# Patient Record
Sex: FEMALE | Race: WHITE | NOT HISPANIC OR LATINO
[De-identification: names, ages, dates, MRNs, and addresses within clinical notes are randomized per-mention and may not be internally consistent; named-entity substitution may affect disease eponyms.]

---

## 2023-05-28 ENCOUNTER — APPOINTMENT (OUTPATIENT)
Dept: ORTHOPEDIC SURGERY | Facility: CLINIC | Age: 73
End: 2023-05-28
Payer: MEDICARE

## 2023-05-28 VITALS — BODY MASS INDEX: 22.9 KG/M2 | WEIGHT: 160 LBS | HEIGHT: 70 IN

## 2023-05-28 PROBLEM — Z00.00 ENCOUNTER FOR PREVENTIVE HEALTH EXAMINATION: Status: ACTIVE | Noted: 2023-05-28

## 2023-05-28 PROCEDURE — J3490M: CUSTOM | Mod: NC

## 2023-05-28 PROCEDURE — 20610 DRAIN/INJ JOINT/BURSA W/O US: CPT | Mod: RT

## 2023-05-28 PROCEDURE — 99213 OFFICE O/P EST LOW 20 MIN: CPT | Mod: 25

## 2023-05-28 PROCEDURE — 73562 X-RAY EXAM OF KNEE 3: CPT | Mod: RT

## 2023-05-28 NOTE — HISTORY OF PRESENT ILLNESS
Put page out to Dr. Clarence Tatum for [facility] Barrow  11/09/17 [id number] [10] : 10 [6] : 6 [Radiating] : radiating [Sharp] : sharp [Tightness] : tightness [Constant] : constant [Meds] : meds [de-identified] : 5/28/23: pt is a 73 y/o F presents with right knee pain; onset of pain on 5 2023; pt states there was no injury; progression of pain in the right knee\par \par PMH: Denied\par Allergies: PCN / EES / Sulfa [] : no [FreeTextEntry7] : from the front of knee into the back of the knee [FreeTextEntry9] : ibuprofen (minimal relief)

## 2023-05-28 NOTE — ASSESSMENT
[FreeTextEntry1] : The patient was advised of the diagnosis. The natural history of the pathology was explained in full to the patient in layman's terms. All questions were answered. The risks and benefits of surgical and non-surgical treatment alternatives were explained in full to the patient.\par Large joint injection of the right knee is performed. The indication for this procedure was pain, inflammation. \par The site was prepped with alcohol, ethyl chloride sprayed topically and sterile technique used. An injection of Lidocaine 3cc of 2% , Bupivacaine (Marcaine) 3cc of 0.5% , Triamcinolone (Kenalog) 2cc of 40 mg  was used.   Patient tolerated procedure well. Patient was advised to call if redness, pain or fever occur and apply ice for 15 minutes out of every hour for the next 12-24 hours as tolerated. The risks benefits, and alternatives have been discussed, and verbal consent was obtained.\par \par Pt provided right knee CSI today.\par RTO in 2 weeks for f/u care with Dr. García.\par OTC Aleve bid prn pain.

## 2023-05-28 NOTE — IMAGING
[Right] : right knee [There are no fractures, subluxations or dislocations. No significant abnormalities are seen] : There are no fractures, subluxations or dislocations. No significant abnormalities are seen [de-identified] : Right hip skin changes / bony deformity:\par ROM: Full and pain free. \par TTP: none\par \par \par Right knee examination shows-\par Mild effusion is noted\par TTP: over the lateral joint\par Strength testin/5 all planes\par Anterior Drawer Test : negative\par Lachman Test: negative\par Valgus Stress Test: negative\par Varus Stress Test : negative\par Posterior Drawer Test: negative\par Iman Test: negative\par Calf is soft , supple and nttp.\par Lower extremity is nvi.\par Gait is: minimally antalgic to the left. \par  [FreeTextEntry9] : right medial and PTF moderate OA.

## 2023-06-06 ENCOUNTER — APPOINTMENT (OUTPATIENT)
Dept: ORTHOPEDIC SURGERY | Facility: CLINIC | Age: 73
End: 2023-06-06
Payer: MEDICARE

## 2023-06-06 VITALS — BODY MASS INDEX: 22.9 KG/M2 | WEIGHT: 160 LBS | HEIGHT: 70 IN

## 2023-06-06 DIAGNOSIS — Z78.9 OTHER SPECIFIED HEALTH STATUS: ICD-10-CM

## 2023-06-06 PROCEDURE — 99213 OFFICE O/P EST LOW 20 MIN: CPT

## 2023-06-06 NOTE — IMAGING
[Right] : right knee [All Views] : anteroposterior, lateral, skyline, and anteroposterior standing [There are no fractures, subluxations or dislocations. No significant abnormalities are seen] : There are no fractures, subluxations or dislocations. No significant abnormalities are seen [Mild tricompartmental OA medial narrowing] : Mild tricompartmental OA medial narrowing

## 2023-06-08 ENCOUNTER — APPOINTMENT (OUTPATIENT)
Dept: ORTHOPEDIC SURGERY | Facility: CLINIC | Age: 73
End: 2023-06-08
Payer: MEDICARE

## 2023-06-08 VITALS — BODY MASS INDEX: 22.9 KG/M2 | HEIGHT: 70 IN | WEIGHT: 160 LBS

## 2023-06-08 PROCEDURE — 99213 OFFICE O/P EST LOW 20 MIN: CPT

## 2023-06-08 NOTE — HISTORY OF PRESENT ILLNESS
[2] : 2 [0] : 0 [Dull/Aching] : dull/aching [Localized] : localized [Occasional] : occasional [Rest] : rest [Meds] : meds [Ice] : ice [Injection therapy] : injection therapy [Walking] : walking [Stairs] : stairs [Retired] : Work status: retired [de-identified] : 71 yo F here for right knee pain since end of May 2023 with no specific injury. No prior injuries or surgeries to the knee. Denies buckling/locking, numbness/tingling. Has occasional stiffness.Was seen at [facility] on 5/28/23, had x-rays, Tx with CSI which gave good relief. Has been icing and resting. [] : Post Surgical Visit: no [FreeTextEntry1] : RT knee [FreeTextEntry5] : 73 yo F here for right knee pain since end of May 2023 with no specific injury. No prior injuries or surgeries to the knee. Denies buckling/locking, numbness/tingling. Has occasional stiffness.Was seen at [facility] on 5/28/23, had x-rays, Tx with CSI which gave good relief. Has been icing and resting. Went to see Dr. García on 6/6/23. Here for second opinion.  [FreeTextEntry9] : Ibuprofen [de-identified] : 6/6/23 [de-identified] : Dr. García [de-identified] : X-rays [de-identified] : none

## 2023-06-08 NOTE — IMAGING
[de-identified] : No effusion, no warmth, no ecchymosis\par Medial joint line tenderness to palpation \par Range of motion 0-130 with mild anterior crepitus\par 5/5 quadriceps and hamstring strength\par Ligamentously stable\par Motor and sensory intact distally\par Non antalgic gait\par Negative Maegan\par

## 2023-06-08 NOTE — ASSESSMENT
[FreeTextEntry1] : 72 year F WITH MODERATE RT KNEE PAIN. WENT TO [facility] ON 5/28/23 AND RECEIVED CSI. PAIN WORSENS WITH STAIRS AND WALKING PROLONGED DISTANCES. PAIN IS AFFECTING ADL AND FUNCTIONAL ACTIVITIES. XRAYS FROM 5/28/23 REVIEWED WITH MODERATE MEDIAL, PF OA. TREATMENT OPTIONS REVIEWED. QUESTIONS ANSWERED.

## 2023-06-08 NOTE — PHYSICAL EXAM
[Right] : right knee [NL (140)] : flexion 140 degrees [NL (0)] : extension 0 degrees [] : no atrophy

## 2023-06-08 NOTE — ASSESSMENT
[FreeTextEntry1] : Exacerbation of mild-moderate varus OA.  Reviewed the notes and records from Dr. García and from the urgent care visit with SHEILA Dubon.  Not interested in the other treatments at this time aside from physical therapy.  Prescription given today.  Discussed viscosupplementation next visit if needed.

## 2023-06-08 NOTE — HISTORY OF PRESENT ILLNESS
[1] : 2 [0] : 0 [Dull/Aching] : dull/aching [Localized] : localized [Occasional] : occasional [Leisure] : leisure [Rest] : rest [Meds] : meds [Ice] : ice [Injection therapy] : injection therapy [Stairs] : stairs [de-identified] : 6/6/2023: 71 yo F here for right knee pain since end of May 2023 with no specific injury. No prior injuries or surgeries to the knee. Denies buckling/locking, numbness/tingling. Has occasional stiffness.Was seen at [facility] on 5/28/23, had x-rays, Tx with CSI which gave good relief. Has been icing and resting.\par  [] : Post Surgical Visit: no [FreeTextEntry1] : RT knee [FreeTextEntry5] : No injury [de-identified] : 5/28/23 [de-identified] : [facility] [de-identified] : x-rays at [facility] [de-identified] : CSI, Aleve

## 2023-07-20 ENCOUNTER — APPOINTMENT (OUTPATIENT)
Dept: ORTHOPEDIC SURGERY | Facility: CLINIC | Age: 73
End: 2023-07-20
Payer: MEDICARE

## 2023-07-20 VITALS — HEIGHT: 70 IN | WEIGHT: 160 LBS | BODY MASS INDEX: 22.9 KG/M2

## 2023-07-20 DIAGNOSIS — M17.11 UNILATERAL PRIMARY OSTEOARTHRITIS, RIGHT KNEE: ICD-10-CM

## 2023-07-20 PROCEDURE — 99213 OFFICE O/P EST LOW 20 MIN: CPT

## 2023-07-20 NOTE — IMAGING
[de-identified] : Varus deformity\par Mild effusion, no warmth, no ecchymosis\par Medial joint line and anterior tenderness to palpation \par Range of motion 0-120 with associated crepitus\par 5/5 quadriceps and hamstring strength\par Ligamentously stable\par Motor and sensory intact distally\par Non antalgic gait\par Negative Maegan\par

## 2023-07-20 NOTE — ASSESSMENT
[FreeTextEntry1] : She reports significant improvement after physical therapy so the prescription was renewed today.  Need for continued home exercise program reviewed.  Will contact me as needed at this point

## 2023-07-20 NOTE — HISTORY OF PRESENT ILLNESS
[2] : 2 [0] : 0 [FreeTextEntry1] : RT knee [FreeTextEntry5] : Pt is here for follow up for the right knee. States pain has improved greatly. Is going to PT.  [de-identified] : PT